# Patient Record
Sex: MALE | Race: WHITE | NOT HISPANIC OR LATINO | Employment: STUDENT | URBAN - METROPOLITAN AREA
[De-identification: names, ages, dates, MRNs, and addresses within clinical notes are randomized per-mention and may not be internally consistent; named-entity substitution may affect disease eponyms.]

---
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
## 2025-03-05 ENCOUNTER — COMPREHENSIVE EXAM (OUTPATIENT)
Age: 20
End: 2025-03-05

## 2025-03-05 DIAGNOSIS — H52.223: ICD-10-CM

## 2025-03-05 DIAGNOSIS — H52.13: ICD-10-CM

## 2025-03-05 PROCEDURE — 92015 DETERMINE REFRACTIVE STATE: CPT

## 2025-03-05 PROCEDURE — 92014 COMPRE OPH EXAM EST PT 1/>: CPT
